# Patient Record
Sex: FEMALE | Race: WHITE | URBAN - METROPOLITAN AREA
[De-identification: names, ages, dates, MRNs, and addresses within clinical notes are randomized per-mention and may not be internally consistent; named-entity substitution may affect disease eponyms.]

---

## 2017-12-21 ENCOUNTER — HOSPITAL ENCOUNTER (OUTPATIENT)
Dept: PHYSICAL THERAPY | Age: 57
Discharge: HOME | End: 2017-12-21
Payer: COMMERCIAL

## 2017-12-21 PROCEDURE — 97140 MANUAL THERAPY 1/> REGIONS: CPT

## 2017-12-21 PROCEDURE — 97110 THERAPEUTIC EXERCISES: CPT

## 2017-12-21 PROCEDURE — 97162 PT EVAL MOD COMPLEX 30 MIN: CPT

## 2017-12-21 NOTE — PROGRESS NOTES
[address] [facility] [address] [facility] [address] - Phone: [phone]  Fax: [phone] / [address] [place] [address] PHYSICAL THERAPY SERVICES  Patient Name: Pierce Zuniga : 1960   Medical   Diagnosis: Low back pain [M54.5] Treatment Diagnosis: Low back pain [M54.5]   Onset Date: 2017     Referral Source: Alexis Griggs MD [facility]): 2017   Prior Hospitalization: See medical history Provider #: [id number]   Prior Level of Function: Symptom free with ADLs   Comorbidities: History of R sciatica 10 years ago, Fatty liver disease   Medications: Verified on Patient Summary List   The Plan of Care and following information is based on the information from the initial evaluation.   ==================================================================================  Assessment / key information: Patient  is a 56 y.o. yo female who presents to [facility] with diagnosis of Low back pain [M54.5]. Patient reports onset of R low back, buttock and posterior thigh pain 2017 when she bent over to  groceries. Pain was improving but then she reinjured herself 10 days later by bending over again. She initially had tingling into the R posterior thigh but she has not experienced that in the past week. She reports that an xray of the spine showed a bulging disc but was otherwise normal. Pain level is an intermittent 3/10 to 7/10 which increases with getting out of the car, getting out of bed and extended standing, decreases with heat. Upon objective evaluation, patient demonstrates SI hypomobility with R anterior ileal rotation, impaired trunk AROM in all planes, decreased core strength, postural deviation of protruding abdomen and impaired flexibility of hamstring and piriformis muscles.   There was no tenderness to palpation over L/S paraspinal, QL or piriformis muscles on the right. She had significant difficulty with all bed mobility. Patient scored 46 on FOTO indicating decreased functional status and quality of life. Functional limitations include getting in and out of the car, bed mobility, standing > 20 minutes, walking, household chores, lifting and bending. 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 Patient can benefit from skilled PT interventions to improve SI mobility, trunk AROM and core strength decrease pain and for education on posture, body mechanics to facilitate ADLs & overall functional status/quality of life.    ==================================================================================  Eval Complexity: History: MEDIUM  Complexity : 1-2 comorbidities / personal factors will impact the outcome/ POC Exam:HIGH Complexity : 4+ Standardized tests and measures addressing body structure, function, activity limitation and / or participation in recreation  Presentation: MEDIUM Complexity : Evolving with changing characteristics  Clinical Decision Making:MEDIUM Complexity : FOTO score of 26-74Overall Complexity:MEDIUM  Problem List: pain affecting function, decrease ROM, decrease strength, decrease ADL/ functional abilitiies, decrease activity tolerance and decrease flexibility/ joint mobility   Treatment Plan may include any combination of the following: Therapeutic exercise, Therapeutic activities, Physical agent/modality, Manual therapy, Patient education and Functional mobility training  Patient / Family readiness to learn indicated by: asking questions, trying to perform skills and interest  Persons(s) to be included in education: patient (P)  Barriers to Learning/Limitations: None  Measures taken:    Patient Goal (s): \"I want to be able to be 100% better like I always have been with no back issues\"    Patient self reported health status: good  Rehabilitation Potential: good   Short Term Goals:  To be accomplished in 3  weeks:  1) Patient performing daily HEP. 2) Patient able to maintain SI mobility or self correct to increase ability to perform bed mobility. 3) Patient able to perform engage core in sitting. 4)  Increase FOTO to 62 indicating improved function and quality of life.  Long Term Goals: To be accomplished in 6 weeks:  1) Patient  independent  with HEP and able to demo proper body mechanics for floor to chest lifting. 2) Patient will increase trunk AROM to no< 75% of normal to increase ability to perform ADLs. 3) Increase FOTO to 73 indicating improved function and quality of life. Frequency / Duration:   Patient to be seen  2  times per week for 4-6  weeks:  Patient / Caregiver education and instruction: activity modification and exercises  G-Codes (GP): berhane    Therapist Signature: Jazmine Patterson PT Date: 74/26/2264   Certification Period: na Time: 3:43 PM   ==================================================================================  I certify that the above Physical Therapy Services are being furnished while the patient is under my care. I agree with the treatment plan and certify that this therapy is necessary. Physician Signature:        Date:       Time:     Please sign and return to [facility] or you may fax the signed copy to [phone]. Thank you.

## 2017-12-21 NOTE — PROGRESS NOTES
PHYSICAL THERAPY - DAILY TREATMENT NOTE    Patient Name: Maureen Dumont        Date: 2017  : 1960   YES Patient  Verified  Visit #:   1     Insurance: Payor: Marcio Sales / Plan: Moises Damon / Product Type: HMO /      In time: 2:38 Out time: 3:38   Total Treatment Time: 60     TREATMENT AREA =  Low back pain [M54.5]    SUBJECTIVE  Pain Level (on 0 to 10 scale):  3  / 10   Medication Changes/New allergies or changes in medical history, any new surgeries or procedures? NO    If yes, update Summary List   Subjective Functional Status/Changes:  []  No changes reported     See medical history           OBJECTIVE  Modalities Rationale:     decrease inflammation, decrease pain and increase tissue extensibility to improve patient's ability to perform bed mobility   min [] Estim, type/location:                                      []  att     []  unatt     []  w/US     []  w/ice    []  w/heat    min []  Mechanical Traction: type/lbs                   []  pro   []  sup   []  int   []  cont    []  before manual    []  after manual    min []  Ultrasound, settings/location:      min []  Iontophoresis w/ dexamethasone, location:                                               []  take home patch       []  in clinic   10 min [x]  Ice     []  Heat    location/position: L/S supine    min []  Vasopneumatic Device, press/temp:     min []  Other:    [x] Skin assessment post-treatment (if applicable):    [x]  intact    []  redness- no adverse reaction     []redness - adverse reaction:        8 min Manual Therapy: MET R anterior ileal rotation   Rationale:      decrease pain, increase ROM and increase tissue extensibility to improve patient's ability to perform bed mobility      10 min Patient Education:  YES  Reviewed HEP   []  Pt educated in spinal anatomy, function and dysfunction as related to diagnosis. Instructed in proper body mechanics for supine to sit and correct execution of engaging core. 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 Progress toward goals / Updated goals:    Initial evaluation completed with home exercise program and education initiated. PLAN  [x]  Upgrade activities as tolerated YES Continue plan of care   []  Discharge due to :    []  Other:      Therapist: Kenneth Pelaez, PT    Date: 12/21/2017 Time: 3:32 PM     No future appointments.

## 2017-12-26 ENCOUNTER — HOSPITAL ENCOUNTER (OUTPATIENT)
Dept: PHYSICAL THERAPY | Age: 57
Discharge: HOME | End: 2017-12-26
Payer: COMMERCIAL

## 2017-12-26 PROCEDURE — 97140 MANUAL THERAPY 1/> REGIONS: CPT

## 2017-12-26 PROCEDURE — 97110 THERAPEUTIC EXERCISES: CPT

## 2017-12-26 PROCEDURE — 97530 THERAPEUTIC ACTIVITIES: CPT

## 2017-12-26 NOTE — PROGRESS NOTES
PHYSICAL THERAPY - DAILY TREATMENT NOTE    Patient Name: Cristóbal Elena        Date: 2017  : 1960   YES Patient  Verified  Visit #:   2     Insurance: Payor: Loraine Villagomez / Plan: [employer] / Product Type: HMO /      In time: 3:05 Out time: 4:08   Total Treatment Time: 63     TREATMENT AREA =  Low back pain [M54.5]    SUBJECTIVE  Pain Level (on 0 to 10 scale):  3  / 10   Medication Changes/New allergies or changes in medical history, any new surgeries or procedures? NO    If yes, update Summary List   Subjective Functional Status/Changes:  []  No changes reported     Having R LBP radiating to buttock and upper posterior thigh. No numbness but feels . OBJECTIVE  Modalities Rationale:     decrease inflammation, decrease pain and increase tissue extensibility to improve patient's ability to perform bed mobility   min [] Estim, type/location:                                      []  att     []  unatt     []  w/US     []  w/ice    []  w/heat    min []  Mechanical Traction: type/lbs                   []  pro   []  sup   []  int   []  cont    []  before manual    []  after manual    min []  Ultrasound, settings/location:      min []  Iontophoresis w/ dexamethasone, location:                                               []  take home patch       []  in clinic   10 min []  Ice     [x]  Heat    location/position: Supine L/S    min []  Vasopneumatic Device, press/temp:     min []  Other:    [x] Skin assessment post-treatment (if applicable):    [x]  intact    []  redness- no adverse reaction     []redness - adverse reaction:        35 min Therapeutic Exercise:  [x]  See flow sheet   Rationale:      increase strength to improve the patients ability to perform prolonged standing and walking      10 min Manual Therapy: MET R anterior ileal rotation. DTM R SI region.    Rationale:      decrease pain, increase ROM and increase tissue extensibility to improve patient's ability to perform prolonged standing and walking. 8 min Therapeutic Activity: Proper sitting posture with lumbar roll and proper floor to chest lifting      Rationale:     to improve the patients ability to use proper posture and body mechanics to avoid injury.  :         min Patient Education:  YES  Reviewed HEP   [x]  Progressed/Changed HEP based on:  Initial handout for core exercises. Try standing extension when gets buttock or leg symptoms. Other Objective/Functional Measures: Added BKFO, hip add ball/abd TB, hamstring stretch, standing extension. + standing flexion. Post Treatment Pain Level (on 0 to 10) scale:   1  / 10     ASSESSMENT  Assessment/Changes in Function:     Continued SI hypomobility     []  See Progress Note/Recertification   Patient will continue to benefit from skilled PT services to modify and progress therapeutic interventions, address functional mobility deficits, address ROM deficits, address strength deficits, analyze and address soft tissue restrictions, analyze and modify body mechanics/ergonomics, assess and modify postural abnormalities and instruct in home and community integration to attain remaining goals. Progress toward goals / Updated goals:  1) Patient performing daily HEP. progressing  2) Patient able to maintain SI mobility or self correct to increase ability to perform bed mobility. 3) Patient able to perform engage core in sitting.   4)  Increase FOTO to 62 indicating improved function and quality of life       PLAN  [x]  Upgrade activities as tolerated YES Continue plan of care   []  Discharge due to :    []  Other:      Therapist: Flash Zhao PT    Date: 12/26/2017 Time: 3:07 PM     Future Appointments  Date Time Provider Charlee Serna   12/28/2017 5:00 PM Farhana Hannon PTA [place] [address]   1/3/2018 5:00 PM Juve Eugene [place] [address]   1/5/2018 9:30 AM Farhana Hannon PTA [place] [address]   1/8/2018 10:00 AM Farhana Hannon PTA [place] [address]   1/12/2018 9:30 TED Kwong, PT [facility]

## 2017-12-28 ENCOUNTER — HOSPITAL ENCOUNTER (OUTPATIENT)
Dept: PHYSICAL THERAPY | Age: 57
Discharge: HOME | End: 2017-12-28
Payer: COMMERCIAL

## 2017-12-28 PROCEDURE — 97110 THERAPEUTIC EXERCISES: CPT

## 2017-12-28 PROCEDURE — 97530 THERAPEUTIC ACTIVITIES: CPT

## 2017-12-28 PROCEDURE — 97140 MANUAL THERAPY 1/> REGIONS: CPT

## 2017-12-28 NOTE — PROGRESS NOTES
PHYSICAL THERAPY - DAILY TREATMENT NOTE    Patient Name: Deepali Hyman        Date: 2017  : 1960   YES Patient  Verified  Visit #:   3     Insurance: Payor: Lulú Victoria / Plan: Edson Valdovinos / Product Type: HMO /      In time: 5;12 pm Out time: 6:02 am   Total Treatment Time: 50   TREATMENT AREA =  Low back pain [M54.5]    SUBJECTIVE  Pain Level (on 0 to 10 scale): 4 / 10-R post thigh increases with movement . intermittent   Medication Changes/New allergies or changes in medical history, any new surgeries or procedures? NO    If yes, update Summary List   Subjective Functional Status/Changes:  []  No changes reported     \"I'm having a lot of pain down my R leg.  \"          OBJECTIVE  Modalities Rationale:     decrease edema, decrease inflammation, decrease pain and increase tissue extensibility to improve patient's ability to getting in and out of car, bed mobility, standing > 20 min,    min [] Estim, type/location:                                      []  att     []  unatt     []  w/US     []  w/ice    []  w/heat    min []  Mechanical Traction: type/lbs                   []  pro   []  sup   []  int   []  cont    []  before manual    []  after manual    min []  Ultrasound, settings/location:      min []  Iontophoresis w/ dexamethasone, location:                                               []  take home patch       []  in clinic   10 min []  Ice     [x]  Heat    location/position:     min []  Vasopneumatic Device, press/temp:     min []  Other:    [x] Skin assessment post-treatment (if applicable):    [x]  intact    []  redness- no adverse reaction     []redness - adverse reaction:        20 min Therapeutic Exercise:  [x]  See flow sheet   Rationale:      increase ROM and increase strength to improve the patients ability to  getting in and out of car, bed mobility, standing > 20 min,       12 min Manual Therapy: Left side lying DTM /TrPt release to R post hip, corrected R ant innominate with MET. Pt instructed in self correction   Rationale:      decrease pain, increase ROM, increase tissue extensibility and decrease trigger points to improve patient's ability to  getting in and out of car, bed mobility, standing > 20 min,       8 min Therapeutic Activity: netural spine positioning with towel roll, golfers bend, activity modification in standing positioning for pain management   Rationale:    increase ROM and increase proprioception to improve the patients ability to perform functional ADLs       min Patient Education:  YES  Reviewed HEP   []  Progressed/Changed HEP based on: Other Objective/Functional Measures:  Standing trunk ext: no change  Review HEP, add piriformis stretch   Post Treatment Pain Level (on 0 to 10) scale:   0  / 10     ASSESSMENT  Assessment/Changes in Function:   Pt able to abolish pain in standing after session. Pt education in positioning, posture, HEP for pain management. Advanced gentle post hip stretching. []  See Progress Note/Recertification   Patient will continue to benefit from skilled PT services to modify and progress therapeutic interventions, address functional mobility deficits, address ROM deficits, address strength deficits, analyze and address soft tissue restrictions and instruct in home and community integration to attain remaining goals. Progress toward goals / Updated goals:    First visit from initial evaluation.  Progressed treatment per plan of care     PLAN  []  Upgrade activities as tolerated YES Continue plan of care   []  Discharge due to :    []  Other:      Therapist: Afshin Bauer PTA    Date: 12/28/2017 Time: 6:02  PM     Future Appointments  Date Time Provider Charlee Serna   1/3/2018 5:00 PM Sergey [address] [place] [address]   1/5/2018 9:30 AM Afshin Bauer PTA [facility]   1/8/2018 10:00 AM Afshin Bauer PTA [facility]   1/12/2018 9:30 AM Usha Aviles PT [facility]

## 2018-01-03 ENCOUNTER — HOSPITAL ENCOUNTER (OUTPATIENT)
Dept: PHYSICAL THERAPY | Age: 58
End: 2018-01-03

## 2018-01-05 ENCOUNTER — APPOINTMENT (OUTPATIENT)
Dept: PHYSICAL THERAPY | Age: 58
End: 2018-01-05

## 2018-01-08 ENCOUNTER — HOSPITAL ENCOUNTER (OUTPATIENT)
Dept: PHYSICAL THERAPY | Age: 58
End: 2018-01-08

## 2018-01-12 ENCOUNTER — APPOINTMENT (OUTPATIENT)
Dept: PHYSICAL THERAPY | Age: 58
End: 2018-01-12

## 2018-03-02 NOTE — PROGRESS NOTES
[place] [facility] AT [facility] [address], [address] [facility], [address] - Phone: [phone]  Fax: [phone] SUMMARY  Patient Name: Alto Gaucher : 1960   Treatment/Medical Diagnosis: Low back pain [M54.5]   Referral Source: Maira Bang MD     Date of Initial Visit: 17 Attended Visits: 3 Missed Visits: 0     SUMMARY OF TREATMENT  Physical Therapy treatment has consisted of Therapeutic exercise for lumbar stabilization and ROM, HEP, Manual Therapy, and  Moist heat. CURRENT STATUS  Pt did not return after 17 visit secondary to insurance issue. Goal/Measure of Progress Goal Met? 1. Patient performing daily HEP. Status at last Eval: dependent Current Status: Pt instructed in initial HEP yes   2. Patient able to maintain SI mobility or self correct to increase ability to perform bed mobility. Status at last Eval: na Current Status: Pt instructed in self SI corretion Unable to be reassessed   3. Patient able to perform engage core in sitting   Status at last Eval: na Current Status: Unable to be reassessed no   4. Increase FOTO to 62 indicating improved function and quality of life   Status at last Eval: 52 Current Status: Unable to be reassessed. no     RECOMMENDATIONS  Other: insurance issue    If you have any questions/comments please contact us directly at [phone]. Thank you for allowing us to assist in the care of your patient. LPTA Signature: Elizabeth Arenas Date: 17   Therapist Signature:  Marcy Moragn PT Time: 3:28 PM